# Patient Record
Sex: FEMALE | Race: WHITE | Employment: OTHER | URBAN - METROPOLITAN AREA
[De-identification: names, ages, dates, MRNs, and addresses within clinical notes are randomized per-mention and may not be internally consistent; named-entity substitution may affect disease eponyms.]

---

## 2019-04-17 ENCOUNTER — HOSPITAL ENCOUNTER (EMERGENCY)
Age: 69
Discharge: HOME | End: 2019-04-17
Attending: EMERGENCY MEDICINE
Payer: MEDICARE

## 2019-04-17 ENCOUNTER — APPOINTMENT (OUTPATIENT)
Dept: GENERAL RADIOLOGY | Age: 69
End: 2019-04-17
Payer: MEDICARE

## 2019-04-17 VITALS
TEMPERATURE: 98 F | HEIGHT: 64 IN | DIASTOLIC BLOOD PRESSURE: 80 MMHG | OXYGEN SATURATION: 96 % | HEART RATE: 93 BPM | RESPIRATION RATE: 12 BRPM | WEIGHT: 175 LBS | SYSTOLIC BLOOD PRESSURE: 157 MMHG | BODY MASS INDEX: 29.88 KG/M2

## 2019-04-17 DIAGNOSIS — J30.2 SEASONAL ALLERGIES: ICD-10-CM

## 2019-04-17 DIAGNOSIS — Z87.09 HISTORY OF ASTHMA: ICD-10-CM

## 2019-04-17 DIAGNOSIS — J98.01 ACUTE BRONCHOSPASM: Primary | ICD-10-CM

## 2019-04-17 LAB
ABSOLUTE EOS #: 0 K/UL (ref 0–0.4)
ABSOLUTE IMMATURE GRANULOCYTE: ABNORMAL K/UL (ref 0–0.3)
ABSOLUTE LYMPH #: 1.2 K/UL (ref 1–4.8)
ABSOLUTE MONO #: 0.4 K/UL (ref 0.1–1.2)
ANION GAP SERPL CALCULATED.3IONS-SCNC: 16 MMOL/L (ref 9–17)
BASOPHILS # BLD: 0 % (ref 0–2)
BASOPHILS ABSOLUTE: 0 K/UL (ref 0–0.2)
BUN BLDV-MCNC: 16 MG/DL (ref 8–23)
BUN/CREAT BLD: ABNORMAL (ref 9–20)
CALCIUM SERPL-MCNC: 9 MG/DL (ref 8.6–10.4)
CHLORIDE BLD-SCNC: 106 MMOL/L (ref 98–107)
CO2: 20 MMOL/L (ref 20–31)
CREAT SERPL-MCNC: 0.71 MG/DL (ref 0.5–0.9)
DIFFERENTIAL TYPE: ABNORMAL
EOSINOPHILS RELATIVE PERCENT: 0 % (ref 1–4)
GFR AFRICAN AMERICAN: >60 ML/MIN
GFR NON-AFRICAN AMERICAN: >60 ML/MIN
GFR SERPL CREATININE-BSD FRML MDRD: ABNORMAL ML/MIN/{1.73_M2}
GFR SERPL CREATININE-BSD FRML MDRD: ABNORMAL ML/MIN/{1.73_M2}
GLUCOSE BLD-MCNC: 142 MG/DL (ref 70–99)
HCT VFR BLD CALC: 40 % (ref 36–46)
HEMOGLOBIN: 13.7 G/DL (ref 12–16)
IMMATURE GRANULOCYTES: ABNORMAL %
LYMPHOCYTES # BLD: 8 % (ref 24–44)
MCH RBC QN AUTO: 29.3 PG (ref 26–34)
MCHC RBC AUTO-ENTMCNC: 34.4 G/DL (ref 31–37)
MCV RBC AUTO: 85.1 FL (ref 80–100)
MONOCYTES # BLD: 3 % (ref 2–11)
NRBC AUTOMATED: ABNORMAL PER 100 WBC
PDW BLD-RTO: 13.8 % (ref 12.5–15.4)
PLATELET # BLD: 307 K/UL (ref 140–450)
PLATELET ESTIMATE: ABNORMAL
PMV BLD AUTO: 7.3 FL (ref 6–12)
POTASSIUM SERPL-SCNC: 3.6 MMOL/L (ref 3.7–5.3)
RBC # BLD: 4.7 M/UL (ref 4–5.2)
RBC # BLD: ABNORMAL 10*6/UL
SEG NEUTROPHILS: 89 % (ref 36–66)
SEGMENTED NEUTROPHILS ABSOLUTE COUNT: 13 K/UL (ref 1.8–7.7)
SODIUM BLD-SCNC: 142 MMOL/L (ref 135–144)
TROPONIN INTERP: NORMAL
TROPONIN T: NORMAL NG/ML
TROPONIN, HIGH SENSITIVITY: 7 NG/L (ref 0–14)
WBC # BLD: 14.6 K/UL (ref 3.5–11)
WBC # BLD: ABNORMAL 10*3/UL

## 2019-04-17 PROCEDURE — 99285 EMERGENCY DEPT VISIT HI MDM: CPT

## 2019-04-17 PROCEDURE — 84484 ASSAY OF TROPONIN QUANT: CPT

## 2019-04-17 PROCEDURE — 85025 COMPLETE CBC W/AUTO DIFF WBC: CPT

## 2019-04-17 PROCEDURE — 93005 ELECTROCARDIOGRAM TRACING: CPT

## 2019-04-17 PROCEDURE — 36415 COLL VENOUS BLD VENIPUNCTURE: CPT

## 2019-04-17 PROCEDURE — 71046 X-RAY EXAM CHEST 2 VIEWS: CPT

## 2019-04-17 PROCEDURE — 80048 BASIC METABOLIC PNL TOTAL CA: CPT

## 2019-04-17 RX ORDER — CLORAZEPATE DIPOTASSIUM 15 MG/1
15 TABLET ORAL 2 TIMES DAILY
COMMUNITY

## 2019-04-17 RX ORDER — FLUTICASONE PROPIONATE 110 UG/1
2 AEROSOL, METERED RESPIRATORY (INHALATION) 2 TIMES DAILY
COMMUNITY

## 2019-04-17 RX ORDER — ALBUTEROL SULFATE 2.5 MG/3ML
2.5 SOLUTION RESPIRATORY (INHALATION) EVERY 6 HOURS PRN
Qty: 60 EACH | Refills: 0 | Status: SHIPPED | OUTPATIENT
Start: 2019-04-17

## 2019-04-17 RX ORDER — VALACYCLOVIR HYDROCHLORIDE 500 MG/1
500 TABLET, FILM COATED ORAL DAILY
COMMUNITY

## 2019-04-17 RX ORDER — FLUOXETINE HYDROCHLORIDE 20 MG/1
20 CAPSULE ORAL DAILY
COMMUNITY

## 2019-04-17 RX ORDER — FAMOTIDINE 20 MG/1
20 TABLET, FILM COATED ORAL 2 TIMES DAILY
COMMUNITY

## 2019-04-17 RX ORDER — GUAIFENESIN 400 MG/1
400 TABLET ORAL 2 TIMES DAILY PRN
COMMUNITY

## 2019-04-17 RX ORDER — ALBUTEROL SULFATE 90 UG/1
2 AEROSOL, METERED RESPIRATORY (INHALATION) EVERY 6 HOURS PRN
COMMUNITY

## 2019-04-17 ASSESSMENT — ENCOUNTER SYMPTOMS
ABDOMINAL PAIN: 0
COUGH: 1
NAUSEA: 0
DIARRHEA: 0
EYE REDNESS: 0
SHORTNESS OF BREATH: 1
SORE THROAT: 1
EYE DISCHARGE: 0
BACK PAIN: 0
VOMITING: 0

## 2019-04-17 NOTE — ED PROVIDER NOTES
[facility] ED  [address] [address]  Phone: [phone]  Fax: [phone]      Attending Physician Attestation    I performed a history and physical examination of the patient and discussed management with the mid level provider. I reviewed the mid level provider's note and agree with the documented findings and plan of care. Any areas of disagreement are noted on the chart. I was personally present for the key portions of any procedures. I have documented in the chart those procedures where I was not present during the key portions. I have reviewed the emergency nurses triage note. I agree with the chief complaint, past medical history, past surgical history, allergies, medications, social and family history as documented unless otherwise noted below. Documentation of the HPI, Physical Exam and Medical Decision Making performed by mid level providers is based on my personal performance of the HPI, PE and MDM. For Physician Assistant/ Nurse Practitioner cases/documentation I have personally evaluated this patient and have completed at least one if not all key elements of the E/M (history, physical exam, and MDM). Additional findings are as noted. CHIEF COMPLAINT       Chief Complaint   Patient presents with    Shortness of Breath    Nasal Congestion    Cough         HISTORY OF PRESENT ILLNESS    Juan Espinoza is a 80 y.o. female who presents with seasonal allergies, cough, congestion, took prednisone which the patient had at home. Symptoms for past week. Chest tightness with cough. PAST MEDICAL HISTORY    has a past medical history of Arthritis, Asthma, Migraine, and Seasonal allergies. SURGICAL HISTORY      has a past surgical history that includes Hysterectomy; Cholecystectomy; and Tonsillectomy.     CURRENT MEDICATIONS       Previous Medications    ALBUTEROL SULFATE  (90 BASE) MCG/ACT INHALER    Inhale 2 puffs into the lungs every 6 hours as needed for Wheezing    CLORAZEPATE (TRANXENE) 15 MG TABLET    Take 15 mg by mouth 2 times daily. FAMOTIDINE (PEPCID) 20 MG TABLET    Take 20 mg by mouth 2 times daily    FLUOXETINE (PROZAC) 20 MG CAPSULE    Take 20 mg by mouth daily    FLUTICASONE (FLOVENT HFA) 110 MCG/ACT INHALER    Inhale 2 puffs into the lungs 2 times daily    GUAIFENESIN 400 MG TABLET    Take 400 mg by mouth 2 times daily as needed for Cough    NONFORMULARY    0.05 mg by Other route daily Indications: L-Thyroxine    VALACYCLOVIR (VALTREX) 500 MG TABLET    Take 500 mg by mouth daily       ALLERGIES     is allergic to azithromycin; ceclor [cefaclor]; cefdinir; and erythromycin. FAMILY HISTORY     has no family status information on file. family history is not on file. SOCIAL HISTORY      reports that she has never smoked. She has never used smokeless tobacco. She reports that she drank alcohol. She reports that she does not use drugs. PHYSICAL EXAM     INITIAL VITALS:  height is 5' 4\" (1.626 m) and weight is 79.4 kg (175 lb). Her temperature is 98 °F (36.7 °C). Her blood pressure is 157/80 (abnormal) and her pulse is 93. Her respiration is 12 and oxygen saturation is 96%. The head is normocephalic. Ears no cervical without obvious mass lesion or deformity. The neck is supple. Trachea midline. No JVD, bruits or adenopathy appreciated. Cardiac S1, S2 with a regular rate and rhythm. Pulmonary is diminished but without focal rales, rhonchi or wheezing. Abdomen soft, nontender. Extremities warm and dry. Good pulses motor sensation throughout. No edema. No tenderness appreciated. Skin without rashes or lesions.   Neurologic no focal deficits are appreciated      DIAGNOSTIC RESULTS     EKG: All EKG's are interpreted by the Emergency Department Physician who either signs or Co-signs this chart in the absence of a cardiologist.      Interpreted by Lennox Gallo, MD     Rhythm: normal sinus   Rate:93  Axis: Granulocytes NOT REPORTED 0 %    Segs Absolute 13.00 (H) 1.8 - 7.7 k/uL    Absolute Lymph # 1.20 1.0 - 4.8 k/uL    Absolute Mono # 0.40 0.1 - 1.2 k/uL    Absolute Eos # 0.00 0.0 - 0.4 k/uL    Basophils # 0.00 0.0 - 0.2 k/uL    Absolute Immature Granulocyte NOT REPORTED 0.00 - 0.30 k/uL    WBC Morphology NOT REPORTED     RBC Morphology NOT REPORTED     Platelet Estimate NOT REPORTED    Basic Metabolic Panel   Result Value Ref Range    Glucose 142 (H) 70 - 99 mg/dL    BUN 16 8 - 23 mg/dL    CREATININE 0.71 0.50 - 0.90 mg/dL    Bun/Cre Ratio NOT REPORTED 9 - 20    Calcium 9.0 8.6 - 10.4 mg/dL    Sodium 142 135 - 144 mmol/L    Potassium 3.6 (L) 3.7 - 5.3 mmol/L    Chloride 106 98 - 107 mmol/L    CO2 20 20 - 31 mmol/L    Anion Gap 16 9 - 17 mmol/L    GFR Non-African American >60 >60 mL/min    GFR African American >60 >60 mL/min    GFR Comment          GFR Staging NOT REPORTED    Troponin   Result Value Ref Range    Troponin, High Sensitivity 7 0 - 14 ng/L    Troponin T NOT REPORTED <0.03 ng/mL    Troponin Interp NOT REPORTED            EMERGENCY DEPARTMENT COURSE:   Vitals:    Vitals:    04/17/19 1144   BP: (!) 157/80   Pulse: 93   Resp: 12   Temp: 98 °F (36.7 °C)   SpO2: 96%   Weight: 79.4 kg (175 lb)   Height: 5' 4\" (1.626 m)     -------------------------  BP: (!) 157/80, Temp: 98 °F (36.7 °C), Pulse: 93, Resp: 12      PERTINENT ATTENDING PHYSICIAN COMMENTS:    Chest x-ray EKG and labs are all unremarkable. The patient is not tachycardic she is not hypoxic. Given this, I do feel able to be followed up as an outpatient.   Recommending she return the ER for worsening symptoms increasing shortness of breath fever greater than 101, vomiting or other concerns otherwise to follow-up with her family doctor within the next few days      (Please note that portions of this note were completed with a voice recognition program.  Efforts were made to edit the dictations but occasionally words are mis-transcribed.)    Alayna Cantor Guevara MD, F.A.C.E.P.   Attending Emergency Medicine Physician       Alfie Payan MD  04/17/19 9439

## 2019-04-17 NOTE — ED PROVIDER NOTES
Negative for decreased urine volume. Musculoskeletal: Negative for back pain, myalgias and neck pain. Skin: Negative for rash. Neurological: Negative for seizures and syncope. All other systems reviewed and are negative. PAST MEDICAL HISTORY    has a past medical history of Arthritis, Asthma, Migraine, and Seasonal allergies. SURGICAL HISTORY      has a past surgical history that includes Hysterectomy; Cholecystectomy; and Tonsillectomy. CURRENT MEDICATIONS       Previous Medications    ALBUTEROL SULFATE  (90 BASE) MCG/ACT INHALER    Inhale 2 puffs into the lungs every 6 hours as needed for Wheezing    CLORAZEPATE (TRANXENE) 15 MG TABLET    Take 15 mg by mouth 2 times daily. FAMOTIDINE (PEPCID) 20 MG TABLET    Take 20 mg by mouth 2 times daily    FLUOXETINE (PROZAC) 20 MG CAPSULE    Take 20 mg by mouth daily    FLUTICASONE (FLOVENT HFA) 110 MCG/ACT INHALER    Inhale 2 puffs into the lungs 2 times daily    GUAIFENESIN 400 MG TABLET    Take 400 mg by mouth 2 times daily as needed for Cough    NONFORMULARY    0.05 mg by Other route daily Indications: L-Thyroxine    VALACYCLOVIR (VALTREX) 500 MG TABLET    Take 500 mg by mouth daily     ALLERGIES     is allergic to azithromycin; ceclor [cefaclor]; cefdinir; and erythromycin. FAMILY HISTORY     Not relevant to the current problem. SOCIAL HISTORY      reports that she has never smoked. She has never used smokeless tobacco. She reports that she drank alcohol. She reports that she does not use drugs. PHYSICAL EXAM     (7 for level 4, 8 or more for level 5)    ED Triage Vitals [04/17/19 1144]   BP Temp Temp src Pulse Resp SpO2 Height Weight   (!) 157/80 98 °F (36.7 °C) -- 93 12 96 % 5' 4\" (1.626 m) 175 lb (79.4 kg)     Physical Exam   Constitutional: No distress. Overweight. Nontoxic. HENT:   Head: Normocephalic and atraumatic.    Right Ear: Tympanic membrane and ear canal normal.   Left Ear: Tympanic membrane and ear canal normal. Oral mucosa is slightly dry. Eyes: No scleral icterus. Neck: Normal range of motion. Neck supple. No meningeal signs. Cardiovascular: Normal rate, regular rhythm and normal heart sounds. Pulmonary/Chest: Effort normal. No respiratory distress. She has no wheezes. She exhibits no tenderness. Subtle expiratory wheeze over the bases bilaterally. Abdominal: Soft. Bowel sounds are normal. There is no tenderness. Musculoskeletal:   Normal ambulation. Neurological: She is alert. Grossly intact. Skin: Skin is warm and dry. No rash noted. She is not diaphoretic. Psychiatric: She has a normal mood and affect. Her behavior is normal.   Vitals reviewed. DIAGNOSTIC RESULTS   EKG  EKG obtained at 12:13 shows normal sinus rhythm at a rate of 93 bpm.  No ST elevations or depressions. No T-wave inversions. Normal axis as interpreted by me. EKG was also interpreted by the attending physician, Dr. Joby Charles. RADIOLOGY:   Radiology images were visualized by myself. The Radiologist interpretations were reviewed and are as follows:     XR CHEST STANDARD (2 VW) (Final result)   Result time 04/17/19 12:31:39   Final result by German Cabello MD (04/17/19 12:31:39)                Impression:    No acute cardiopulmonary disease. Narrative:    EXAMINATION:  TWO VIEWS OF THE CHEST    4/17/2019 12:27 pm    COMPARISON:  None. HISTORY:  ORDERING SYSTEM PROVIDED HISTORY: Chest tightness  TECHNOLOGIST PROVIDED HISTORY:  Chest tightness  Ordering Physician Provided Reason for Exam: cough, dry in nature, with  chills. congestion x 2 weeks  Acuity: Chronic  Type of Exam: Initial  Relevant Medical/Surgical History: no chest surg. hx of family who smoke,  patient does not. FINDINGS:  The cardiomediastinal silhouette is of normal size.  The lungs are grossly  clear.  There is no pleural effusion. Tu [place]ier is no pneumothorax.  There is no  acute osseous abnormality.                  LABS:  Results for orders placed or performed during the hospital encounter of 04/17/19   CBC Auto Differential   Result Value Ref Range    WBC 14.6 (H) 3.5 - 11.0 k/uL    RBC 4.70 4.0 - 5.2 m/uL    Hemoglobin 13.7 12.0 - 16.0 g/dL    Hematocrit 40.0 36 - 46 %    MCV 85.1 80 - 100 fL    MCH 29.3 26 - 34 pg    MCHC 34.4 31 - 37 g/dL    RDW 13.8 12.5 - 15.4 %    Platelets 174 064 - 160 k/uL    MPV 7.3 6.0 - 12.0 fL    NRBC Automated NOT REPORTED per 100 WBC    Differential Type NOT REPORTED     Seg Neutrophils 89 (H) 36 - 66 %    Lymphocytes 8 (L) 24 - 44 %    Monocytes 3 2 - 11 %    Eosinophils % 0 (L) 1 - 4 %    Basophils 0 0 - 2 %    Immature Granulocytes NOT REPORTED 0 %    Segs Absolute 13.00 (H) 1.8 - 7.7 k/uL    Absolute Lymph # 1.20 1.0 - 4.8 k/uL    Absolute Mono # 0.40 0.1 - 1.2 k/uL    Absolute Eos # 0.00 0.0 - 0.4 k/uL    Basophils # 0.00 0.0 - 0.2 k/uL    Absolute Immature Granulocyte NOT REPORTED 0.00 - 0.30 k/uL    WBC Morphology NOT REPORTED     RBC Morphology NOT REPORTED     Platelet Estimate NOT REPORTED    Basic Metabolic Panel   Result Value Ref Range    Glucose 142 (H) 70 - 99 mg/dL    BUN 16 8 - 23 mg/dL    CREATININE 0.71 0.50 - 0.90 mg/dL    Bun/Cre Ratio NOT REPORTED 9 - 20    Calcium 9.0 8.6 - 10.4 mg/dL    Sodium 142 135 - 144 mmol/L    Potassium 3.6 (L) 3.7 - 5.3 mmol/L    Chloride 106 98 - 107 mmol/L    CO2 20 20 - 31 mmol/L    Anion Gap 16 9 - 17 mmol/L    GFR Non-African American >60 >60 mL/min    GFR African American >60 >60 mL/min    GFR Comment          GFR Staging NOT REPORTED    Troponin   Result Value Ref Range    Troponin, High Sensitivity 7 0 - 14 ng/L    Troponin T NOT REPORTED <0.03 ng/mL    Troponin Interp NOT REPORTED      MDM:   Patient presents to the ER for evaluation of a dry cough, shortness of breath and weakness. Patient states that she has a history of asthma and severe allergies. She states that she has been sick for approximately one week. Patient has had chills, but no fevers.   She soon as possible for a visit   For reevaluation in 2-3 days    DISCHARGE MEDICATIONS:  New Prescriptions    ALBUTEROL (PROVENTIL) (2.5 MG/3ML) 0.083% NEBULIZER SOLUTION    Take 3 mLs by nebulization every 6 hours as needed for Wheezing    RESPIRATORY THERAPY SUPPLIES (NEBULIZER COMPRESSOR) KIT    1 kit by Does not apply route once for 1 dose     (Please note that portions of this note were completed with a voice recognition program.  Efforts were made to edit the dictations but occasionally words are mis-transcribed.)    Paco Ayoub PA-C  04/17/19 2235

## 2019-04-18 LAB
EKG ATRIAL RATE: 93 BPM
EKG P AXIS: 51 DEGREES
EKG P-R INTERVAL: 162 MS
EKG Q-T INTERVAL: 358 MS
EKG QRS DURATION: 80 MS
EKG QTC CALCULATION (BAZETT): 445 MS
EKG R AXIS: 31 DEGREES
EKG T AXIS: 54 DEGREES
EKG VENTRICULAR RATE: 93 BPM